# Patient Record
Sex: FEMALE | Race: WHITE | NOT HISPANIC OR LATINO | Employment: FULL TIME | URBAN - NONMETROPOLITAN AREA
[De-identification: names, ages, dates, MRNs, and addresses within clinical notes are randomized per-mention and may not be internally consistent; named-entity substitution may affect disease eponyms.]

---

## 2023-10-31 ENCOUNTER — OFFICE VISIT (OUTPATIENT)
Dept: CARDIOLOGY | Facility: CLINIC | Age: 33
End: 2023-10-31

## 2023-10-31 VITALS
HEIGHT: 63 IN | DIASTOLIC BLOOD PRESSURE: 89 MMHG | HEART RATE: 75 BPM | SYSTOLIC BLOOD PRESSURE: 158 MMHG | WEIGHT: 235.8 LBS | OXYGEN SATURATION: 99 % | BODY MASS INDEX: 41.78 KG/M2

## 2023-10-31 DIAGNOSIS — R01.1 HEART MURMUR: ICD-10-CM

## 2023-10-31 DIAGNOSIS — R06.02 SHORTNESS OF BREATH: Primary | ICD-10-CM

## 2023-10-31 DIAGNOSIS — R00.2 PALPITATIONS: ICD-10-CM

## 2023-10-31 DIAGNOSIS — R07.89 CHEST TIGHTNESS: ICD-10-CM

## 2023-10-31 PROCEDURE — 93000 ELECTROCARDIOGRAM COMPLETE: CPT | Performed by: NURSE PRACTITIONER

## 2023-10-31 PROCEDURE — 99202 OFFICE O/P NEW SF 15 MIN: CPT | Performed by: NURSE PRACTITIONER

## 2023-10-31 NOTE — PROGRESS NOTES
Subjective     Esther Montes is a 32 y.o. female who presents to day for Palpitations (Skips beats hard beat ), Shortness of Breath (Had possible pericarditis as a child had to have repeated Echo's  ), and Chest Pain (Has some tightness in chest when having skips/).    CHIEF COMPLIANT  Chief Complaint   Patient presents with    Palpitations     Skips beats hard beat     Shortness of Breath     Had possible pericarditis as a child had to have repeated Echo's      Chest Pain     Has some tightness in chest when having skips         Active Problems:  Problem List Items Addressed This Visit    None  Visit Diagnoses       Shortness of breath    -  Primary    Relevant Orders    Comprehensive Metabolic Panel    CBC & Differential    TSH    Magnesium    Adult Transthoracic Echo Complete W/ Cont if Necessary Per Protocol    Holter Monitor - 72 Hour Up To 15 Days    Palpitations        Relevant Orders    Comprehensive Metabolic Panel    CBC & Differential    TSH    Magnesium    Adult Transthoracic Echo Complete W/ Cont if Necessary Per Protocol    Holter Monitor - 72 Hour Up To 15 Days    Chest tightness        Relevant Orders    Comprehensive Metabolic Panel    CBC & Differential    TSH    Magnesium    Adult Transthoracic Echo Complete W/ Cont if Necessary Per Protocol    Holter Monitor - 72 Hour Up To 15 Days    Heart murmur            Problem list  1.  Chest tightness  2.  Shortness of breath  3.  Palpitations  4.  Elevated blood pressure    HPI  HPI  Ms. Gerardo Montes is a 32-year-old female patient who is being seen today for cardiac evaluation.    Patient does report that her major issue is palpitations.  It occurs multiple times throughout the day.  She says she will have 1-2 skipped beats then pounding type sensation that it may go a minute or an hour before the next 1 occurs.  She denies any aggravating factors or relieving factors.  She does have some associated chest tightness and shortness of breath.  She says  when these happen that it sometimes is hard for her to catch her breath.  She does say that she has tried multiple things to try to help the palpitations including decreasing caffeine including soda and coffee and eating spicy foods.  She does report that she drinks an adequate amount of water and exercises.    When discussing her history in her childhood she had multiple EKGs/echocardiograms per her report due to potential pericarditis but is unsure if she was officially diagnosed.  These records are unavailable to us.    Patient did pull up her iPhone abner which shows irregular heartbeats that appear to be PVCs but is insufficient to diagnose.  We did discuss PVCs in detail as well as PACs as a potential cause of her palpitations.  PRIOR MEDS  No current outpatient medications on file prior to visit.     No current facility-administered medications on file prior to visit.       ALLERGIES  Patient has no known allergies.    HISTORY  History reviewed. No pertinent past medical history.    Social History     Socioeconomic History    Marital status:    Tobacco Use    Smoking status: Never    Smokeless tobacco: Never   Vaping Use    Vaping Use: Never used   Substance and Sexual Activity    Alcohol use: Never    Drug use: Never    Sexual activity: Defer       Family History   Problem Relation Age of Onset    Diabetes Father        Review of Systems   Constitutional:  Positive for fatigue. Negative for chills and fever.   HENT:  Negative for congestion, rhinorrhea and sore throat.    Eyes:  Negative for visual disturbance.   Respiratory:  Positive for chest tightness and shortness of breath. Negative for apnea.    Cardiovascular:  Positive for palpitations (skips). Negative for chest pain and leg swelling.   Gastrointestinal:  Negative for constipation, diarrhea and nausea.   Musculoskeletal:  Negative for arthralgias, back pain and neck pain.   Skin:  Negative for rash and wound.   Allergic/Immunologic: Negative  "for environmental allergies and food allergies.   Neurological:  Negative for dizziness, syncope, weakness and light-headedness.   Hematological:  Does not bruise/bleed easily.   Psychiatric/Behavioral:  Negative for sleep disturbance.        Objective     VITALS: /89 (BP Location: Left arm, Patient Position: Sitting, Cuff Size: Adult)   Pulse 75   Ht 160 cm (63\")   Wt 107 kg (235 lb 12.8 oz)   SpO2 99%   BMI 41.77 kg/m²     LABS:   Lab Results (most recent)       None            IMAGING:   No Images in the past 120 days found..    EXAM:  Physical Exam  Vitals and nursing note reviewed.   Constitutional:       Appearance: She is well-developed.   HENT:      Head: Normocephalic.   Neck:      Thyroid: No thyroid mass.      Vascular: Carotid bruit (deferred murmur) present. No JVD.      Trachea: Trachea and phonation normal.   Cardiovascular:      Rate and Rhythm: Normal rate and regular rhythm.      Pulses:           Radial pulses are 2+ on the right side and 2+ on the left side.        Posterior tibial pulses are 2+ on the right side and 2+ on the left side.      Heart sounds: Murmur heard.      No friction rub. No gallop.   Pulmonary:      Effort: Pulmonary effort is normal. No respiratory distress.      Breath sounds: Normal breath sounds. No wheezing or rales.   Musculoskeletal:         General: No swelling. Normal range of motion.      Cervical back: Neck supple.   Skin:     General: Skin is warm and dry.      Capillary Refill: Capillary refill takes less than 2 seconds.      Findings: No rash.   Neurological:      Mental Status: She is alert and oriented to person, place, and time.   Psychiatric:         Speech: Speech normal.         Behavior: Behavior normal.         Thought Content: Thought content normal.         Judgment: Judgment normal.         Procedure     ECG 12 Lead    Date/Time: 10/31/2023 3:28 PM  Performed by: Fidel Portillo APRN    Authorized by: Fidel Portillo APRN  Previous ECG: " no previous ECG available  Rhythm: sinus arrhythmia  Rate: normal  BPM: 68  QRS axis: right  Other findings: non-specific ST-T wave changes  Comments: QTc 410 ms  No acute changes             Assessment & Plan    Diagnosis Plan   1. Shortness of breath  Comprehensive Metabolic Panel    CBC & Differential    TSH    Magnesium    Adult Transthoracic Echo Complete W/ Cont if Necessary Per Protocol    Holter Monitor - 72 Hour Up To 15 Days      2. Palpitations  Comprehensive Metabolic Panel    CBC & Differential    TSH    Magnesium    Adult Transthoracic Echo Complete W/ Cont if Necessary Per Protocol    Holter Monitor - 72 Hour Up To 15 Days      3. Chest tightness  Comprehensive Metabolic Panel    CBC & Differential    TSH    Magnesium    Adult Transthoracic Echo Complete W/ Cont if Necessary Per Protocol    Holter Monitor - 72 Hour Up To 15 Days      4. Heart murmur        1.  Patient does have a heart murmur as well as reported palpitations with associated chest pain and shortness of breath.  Therefore I like for her to go under an echocardiogram for further evaluation of structural abnormalities given her history of potential pericarditis as well as heart murmur.  2.  I also like patient to wear a Holter monitor 14 days to determine the etiology of her palpitations.  3.  I like for patient to have CBC CMP TSH magnesium for further evaluation of potential causes of her palpitations.  4.  We did discuss nonpharmaceutical interventions that may help reduce the occurrence of palpitations.  Encouraged her to drink adequate amounts of fluid including 60 ounces of water per day, exercising, and journaling to help identify potential causes or triggers of her palpitations.  5.  Informed of signs and symptoms of ACS and advised to seek emergent treatment for any new worsening symptoms.  Patient also advised sooner follow-up as needed.  Also advised to follow-up with family doctor as needed  This note is dictated utilizing  voice recognition software.  Although this record has been proof read, transcriptional errors may still be present. If questions occur regarding the content of this record please do not hesitate to call our office.  I have reviewed and confirmed the accuracy of the ROS as documented by the MA/LPN/RN KASHIF Rodriguez    Return if symptoms worsen or fail to improve, for Next scheduled follow up.    Diagnoses and all orders for this visit:    1. Shortness of breath (Primary)  -     Comprehensive Metabolic Panel; Future  -     CBC & Differential; Future  -     TSH; Future  -     Magnesium; Future  -     Adult Transthoracic Echo Complete W/ Cont if Necessary Per Protocol; Future  -     Holter Monitor - 72 Hour Up To 15 Days; Future    2. Palpitations  -     Comprehensive Metabolic Panel; Future  -     CBC & Differential; Future  -     TSH; Future  -     Magnesium; Future  -     Adult Transthoracic Echo Complete W/ Cont if Necessary Per Protocol; Future  -     Holter Monitor - 72 Hour Up To 15 Days; Future    3. Chest tightness  -     Comprehensive Metabolic Panel; Future  -     CBC & Differential; Future  -     TSH; Future  -     Magnesium; Future  -     Adult Transthoracic Echo Complete W/ Cont if Necessary Per Protocol; Future  -     Holter Monitor - 72 Hour Up To 15 Days; Future    4. Heart murmur    Other orders  -     ECG 12 Lead        Esther Montes  reports that she has never smoked. She has never used smokeless tobacco.. I have educated her on the risk of diseases from using tobacco products .         MEDS ORDERED DURING VISIT:  No orders of the defined types were placed in this encounter.          This document has been electronically signed by KASHIF Rodriguez Jr.  October 31, 2023 16:04 EDT

## 2023-11-30 ENCOUNTER — HOSPITAL ENCOUNTER (OUTPATIENT)
Dept: CARDIOLOGY | Facility: HOSPITAL | Age: 33
Discharge: HOME | End: 2023-11-30

## 2023-11-30 DIAGNOSIS — R07.89 CHEST TIGHTNESS: ICD-10-CM

## 2023-11-30 DIAGNOSIS — R06.02 SHORTNESS OF BREATH: ICD-10-CM

## 2023-11-30 DIAGNOSIS — R00.2 PALPITATIONS: ICD-10-CM

## 2023-11-30 LAB
BH CV ECHO MEAS - ACS: 1.69 CM
BH CV ECHO MEAS - AO MAX PG: 16.6 MMHG
BH CV ECHO MEAS - AO MEAN PG: 9 MMHG
BH CV ECHO MEAS - AO ROOT DIAM: 2.24 CM
BH CV ECHO MEAS - AO V2 MAX: 204 CM/SEC
BH CV ECHO MEAS - AO V2 VTI: 36.8 CM
BH CV ECHO MEAS - EDV(CUBED): 87.5 ML
BH CV ECHO MEAS - EDV(MOD-SP4): 99.1 ML
BH CV ECHO MEAS - EF(MOD-SP4): 73.3 %
BH CV ECHO MEAS - EF_3D-VOL: 55 %
BH CV ECHO MEAS - ESV(CUBED): 11.7 ML
BH CV ECHO MEAS - ESV(MOD-SP4): 26.5 ML
BH CV ECHO MEAS - FS: 48.9 %
BH CV ECHO MEAS - IVS/LVPW: 1.07 CM
BH CV ECHO MEAS - IVSD: 1.02 CM
BH CV ECHO MEAS - LA DIMENSION: 3.3 CM
BH CV ECHO MEAS - LAT PEAK E' VEL: 17.7 CM/SEC
BH CV ECHO MEAS - LV DIASTOLIC VOL/BSA (35-75): 47.9 CM2
BH CV ECHO MEAS - LV MASS(C)D: 146.9 GRAMS
BH CV ECHO MEAS - LV SYSTOLIC VOL/BSA (12-30): 12.8 CM2
BH CV ECHO MEAS - LVIDD: 4.4 CM
BH CV ECHO MEAS - LVIDS: 2.27 CM
BH CV ECHO MEAS - LVPWD: 0.95 CM
BH CV ECHO MEAS - MED PEAK E' VEL: 9.4 CM/SEC
BH CV ECHO MEAS - MV A MAX VEL: 72.4 CM/SEC
BH CV ECHO MEAS - MV DEC TIME: 0.21 SEC
BH CV ECHO MEAS - MV E MAX VEL: 103 CM/SEC
BH CV ECHO MEAS - MV E/A: 1.42
BH CV ECHO MEAS - RVDD: 3.1 CM
BH CV ECHO MEAS - SI(MOD-SP4): 35.1 ML/M2
BH CV ECHO MEAS - SV(MOD-SP4): 72.6 ML
BH CV ECHO MEASUREMENTS AVERAGE E/E' RATIO: 7.6
BH CV XLRA - RV BASE: 2.8 CM
BH CV XLRA - RV LENGTH: 8.2 CM
BH CV XLRA - RV MID: 2.29 CM
LEFT ATRIUM VOLUME INDEX: 11.2 ML/M2

## 2023-11-30 PROCEDURE — 93306 TTE W/DOPPLER COMPLETE: CPT

## 2023-12-12 ENCOUNTER — TELEPHONE (OUTPATIENT)
Dept: CARDIOLOGY | Facility: CLINIC | Age: 33
End: 2023-12-12

## 2023-12-12 NOTE — TELEPHONE ENCOUNTER
ECHO  Pt notified of no acute findings. Provider will discuss results at f/u. Pt reminded of appt date and time.  ----- Message from Mariah Bean MA sent at 12/12/2023 12:00 PM EST -----    ----- Message -----  From: Fidel Portillo APRN  Sent: 12/12/2023   8:35 AM EST  To: Mariah Bean MA    There is no acute findings on the echocardiogram.  Keep follow-up.

## 2024-01-11 ENCOUNTER — TELEPHONE (OUTPATIENT)
Dept: CARDIOLOGY | Facility: CLINIC | Age: 34
End: 2024-01-11

## 2024-01-11 NOTE — TELEPHONE ENCOUNTER
1.  Symptoms: Patient's reported symptoms included shortness of breath and palpitations.  patient's  symptoms predominate her during a sinus mechanism with sensed PVCs.  Episodes of bigeminy and trigeminy were noted during reported symptoms.   2.  Predominant rhythm: Normal sinus rhythm   3.  Noted arrhythmias:   -Minimal PAC PVC burden   -No sustained ectopy, dysrhythmia, or block   -Patient did have ventricular bigeminy lasting 10.6 seconds and ventricular trigeminy with episodes of 14.2 seconds     Due to the fact that patient did have since PVCs and episodes of bigeminy and trigeminy that were sensed.  It would be reasonable to treat with medications.  Please see if patient is interested.     I called patient and went over above results with patient . She would like to wait for now about trying medication. She will call if her symptoms worsen.